# Patient Record
Sex: FEMALE | Race: ASIAN | URBAN - METROPOLITAN AREA
[De-identification: names, ages, dates, MRNs, and addresses within clinical notes are randomized per-mention and may not be internally consistent; named-entity substitution may affect disease eponyms.]

---

## 2022-02-17 ENCOUNTER — APPOINTMENT (RX ONLY)
Dept: URBAN - METROPOLITAN AREA CLINIC 45 | Facility: CLINIC | Age: 52
Setting detail: DERMATOLOGY
End: 2022-02-17

## 2022-02-17 DIAGNOSIS — L259 CONTACT DERMATITIS AND OTHER ECZEMA, UNSPECIFIED CAUSE: ICD-10-CM | Status: INADEQUATELY CONTROLLED

## 2022-02-17 PROBLEM — L23.9 ALLERGIC CONTACT DERMATITIS, UNSPECIFIED CAUSE: Status: ACTIVE | Noted: 2022-02-17

## 2022-02-17 PROCEDURE — ? ADDITIONAL NOTES

## 2022-02-17 PROCEDURE — ? TREATMENT REGIMEN

## 2022-02-17 PROCEDURE — ? MEDICATION COUNSELING

## 2022-02-17 PROCEDURE — ? PRESCRIPTION

## 2022-02-17 PROCEDURE — ? COUNSELING

## 2022-02-17 PROCEDURE — 99204 OFFICE O/P NEW MOD 45 MIN: CPT

## 2022-02-17 RX ORDER — TRIAMCINOLONE ACETONIDE 1 MG/G
CREAM TOPICAL BID
Qty: 454 | Refills: 0 | Status: ERX | COMMUNITY
Start: 2022-02-17

## 2022-02-17 RX ADMIN — TRIAMCINOLONE ACETONIDE: 1 CREAM TOPICAL at 00:00

## 2022-02-17 ASSESSMENT — LOCATION SIMPLE DESCRIPTION DERM
LOCATION SIMPLE: ABDOMEN
LOCATION SIMPLE: LEFT FOREARM
LOCATION SIMPLE: RIGHT FOREARM

## 2022-02-17 ASSESSMENT — LOCATION ZONE DERM
LOCATION ZONE: TRUNK
LOCATION ZONE: ARM

## 2022-02-17 ASSESSMENT — LOCATION DETAILED DESCRIPTION DERM
LOCATION DETAILED: LEFT RIB CAGE
LOCATION DETAILED: LEFT VENTRAL PROXIMAL FOREARM
LOCATION DETAILED: RIGHT VENTRAL PROXIMAL FOREARM

## 2022-02-17 NOTE — PROCEDURE: ADDITIONAL NOTES
Render Risk Assessment In Note?: no
Detail Level: Simple
Additional Notes: Recommended skin patch testing Monday Wednesday Friday

## 2022-02-17 NOTE — PROCEDURE: MEDICATION COUNSELING
Xelgioz Pregnancy And Lactation Text: This medication is Pregnancy Category D and is not considered safe during pregnancy.  The risk during breast feeding is also uncertain.

## 2022-02-17 NOTE — PROCEDURE: MIPS QUALITY
Quality 110: Preventive Care And Screening: Influenza Immunization: Influenza Immunization not Administered for Documented Reasons.
Detail Level: Detailed
Quality 431: Preventive Care And Screening: Unhealthy Alcohol Use - Screening: Patient identified as an unhealthy alcohol user when screened for unhealthy alcohol use using a systematic screening method and received brief counseling
Quality 130: Documentation Of Current Medications In The Medical Record: Current Medications Documented
Quality 226: Preventive Care And Screening: Tobacco Use: Screening And Cessation Intervention: Patient screened for tobacco use and is an ex/non-smoker

## 2022-03-14 ENCOUNTER — APPOINTMENT (RX ONLY)
Dept: URBAN - METROPOLITAN AREA CLINIC 45 | Facility: CLINIC | Age: 52
Setting detail: DERMATOLOGY
End: 2022-03-14

## 2022-03-14 DIAGNOSIS — L259 CONTACT DERMATITIS AND OTHER ECZEMA, UNSPECIFIED CAUSE: ICD-10-CM | Status: INADEQUATELY CONTROLLED

## 2022-03-14 PROBLEM — L23.9 ALLERGIC CONTACT DERMATITIS, UNSPECIFIED CAUSE: Status: ACTIVE | Noted: 2022-03-14

## 2022-03-14 PROCEDURE — ? FULL BODY SKIN EXAM - DECLINED

## 2022-03-14 PROCEDURE — ? COUNSELING

## 2022-03-14 PROCEDURE — 95044 PATCH/APPLICATION TESTS: CPT

## 2022-03-14 PROCEDURE — ? ADDITIONAL NOTES

## 2022-03-14 PROCEDURE — ? PATCH TESTING

## 2022-03-14 ASSESSMENT — LOCATION DETAILED DESCRIPTION DERM
LOCATION DETAILED: LEFT VENTRAL PROXIMAL FOREARM
LOCATION DETAILED: RIGHT VENTRAL PROXIMAL FOREARM

## 2022-03-14 ASSESSMENT — LOCATION ZONE DERM: LOCATION ZONE: ARM

## 2022-03-14 ASSESSMENT — LOCATION SIMPLE DESCRIPTION DERM
LOCATION SIMPLE: LEFT FOREARM
LOCATION SIMPLE: RIGHT FOREARM

## 2022-03-14 NOTE — PROCEDURE: PATCH TESTING
Detail Level: None
Number Of Patches (Maximum Allowable Per Dos By Cms Is 90): 47
Consent: Written consent obtained, risks reviewed including but not limited to rash, itching, allergic reaction, systemic rash, remote possiblity of anaphylaxis to allergen.
Post-Care Instructions: I reviewed with the patient in detail post-care instructions. Patient should not sweat, pick at, or get the patches wet for 48 hours.

## 2022-03-14 NOTE — PROCEDURE: ADDITIONAL NOTES
Render Risk Assessment In Note?: no
Detail Level: Simple
Additional Notes: PATCHES 38 & 47 N/A\\n\\n37: 2-hydroxyethyl-methacrylate\\n38: Amidoamine (stearamidopropyl dimethylamine)\\n39: ammonium persulphate\\n40: Cocamidopropylbetaime (CAPB)\\n41: dimethylaminopropylamine (DMAPA)\\n42: ethyl acrylate\\n43: formaldehyde \\n44: fragrance mix 2\\n45: propylene glycol\\n46: propolis\\n47: methylisothiazolinone
Additional Notes: Patient consent was obtained to proceed with the visit and recommended plan of care after discussion of all risks and benefits, including the risks of COVID-19 exposure.

## 2022-03-16 ENCOUNTER — APPOINTMENT (RX ONLY)
Dept: URBAN - METROPOLITAN AREA CLINIC 45 | Facility: CLINIC | Age: 52
Setting detail: DERMATOLOGY
End: 2022-03-16

## 2022-03-16 DIAGNOSIS — L259 CONTACT DERMATITIS AND OTHER ECZEMA, UNSPECIFIED CAUSE: ICD-10-CM

## 2022-03-16 PROBLEM — L23.9 ALLERGIC CONTACT DERMATITIS, UNSPECIFIED CAUSE: Status: ACTIVE | Noted: 2022-03-16

## 2022-03-16 PROCEDURE — 95044 PATCH/APPLICATION TESTS: CPT

## 2022-03-16 PROCEDURE — ? ACDS EXTENDED PATCH TEST READING

## 2022-03-16 PROCEDURE — ? COUNSELING

## 2022-03-16 PROCEDURE — ? ADDITIONAL NOTES

## 2022-03-16 PROCEDURE — ? PATCH TESTING

## 2022-03-16 ASSESSMENT — LOCATION ZONE DERM: LOCATION ZONE: ARM

## 2022-03-16 ASSESSMENT — LOCATION SIMPLE DESCRIPTION DERM
LOCATION SIMPLE: LEFT FOREARM
LOCATION SIMPLE: RIGHT FOREARM

## 2022-03-16 ASSESSMENT — LOCATION DETAILED DESCRIPTION DERM
LOCATION DETAILED: LEFT VENTRAL PROXIMAL FOREARM
LOCATION DETAILED: RIGHT VENTRAL PROXIMAL FOREARM

## 2022-03-16 NOTE — PROCEDURE: ADDITIONAL NOTES
Render Risk Assessment In Note?: no
Detail Level: Simple
Additional Notes: PATCHES 38 & 47 N/A\\n\\n37: 2-hydroxyethyl-methacrylate\\n38: Amidoamine (stearamidopropyl dimethylamine)\\n39: ammonium persulphate\\n40: Cocamidopropylbetaime (CAPB)\\n41: dimethylaminopropylamine (DMAPA)\\n42: ethyl acrylate\\n43: formaldehyde \\n44: fragrance mix 2\\n45: propylene glycol\\n46: propolis\\n47: methylisothiazolinone
Detail Level: Zone
Additional Notes: #1 - +1\\n#20 - +1\\n#25- maybe. Per Fely Hoffmann re check number 25 on Friday.

## 2022-03-16 NOTE — PROCEDURE: ACDS EXTENDED PATCH TEST READING
Allergen 24 Reaction: no reaction
Name Of Allergen 2: Wool Alcohols
Name Of Allergen 19: Methyldibromo Glutaronitrile
Name Of Allergen 30: Budesonide
Name Of Allergen 14: Epoxy Resin
Name Of Allergen 35: Disperse Blue 106
Detail Level: Zone
Name Of Allergen 4: Potassium Dichromate
Name Of Allergen 9: Negative control
Allergen 20 Reaction: 1+
Name Of Allergen 15: Carba Mix
Name Of Allergen 34: parthenolide
Number Of Patches Read: 47
Name Of Allergen 5: Mika Mix
Name Of Allergen 10: Balsam of [place]
Name Of Allergen 27: Tixocortol 21 pivalate
Name Of Allergen 22: Mercapto Mix
Name Of Allergen 17: CI + Me Isothiazolinone
Name Of Allergen 25: Diazolodinyl Urea
Name Of Allergen 32: Mercaptobenzothiazole
Name Of Allergen 7: Colophony
Name Of Allergen 12: Cobalt Dichloride
Name Of Allergen 18: Quaternium -15
Name Of Allergen 24: Thiuram Mix
Name Of Allergen 31: Hydrocortisone 17Butyrate
Name Of Allergen 13: Sheri Butylphenol Formaldehyde
Name Of Allergen 36: 2 Bromo 2 Nitropropane 1- 3 diol
Name Of Allergen 3: Neomycin Sulfate
Name Of Allergen 8: Paraben Mix
Name Of Allergen 20: P-phenylene Diamine
Name Of Allergen 29: Imidazolidinyl urea
What Reading Time Point?: 48 hour
Name Of Allergen 21: Formaldehyde
Name Of Allergen 28: Gold Sodium ThioSulfate
Show Negative Results In The Note?: Yes
Name Of Allergen 16: Black Rubber Mix
Name Of Allergen 26: Quinoline Mix
Name Of Allergen 33: Bacitracin
Show Allergen Counseling In The Note?: No
Name Of Allergen 6: Fragrance Mix
Name Of Allergen 11: Ethylenediamine Dihydrochloride
Allergen 25 Reaction: irritant reaction
Name Of Allergen 1: Nickel Sulfate
Name Of Allergen 23: Thimerosal

## 2022-03-18 ENCOUNTER — APPOINTMENT (RX ONLY)
Dept: URBAN - METROPOLITAN AREA CLINIC 45 | Facility: CLINIC | Age: 52
Setting detail: DERMATOLOGY
End: 2022-03-18

## 2022-03-18 DIAGNOSIS — L259 CONTACT DERMATITIS AND OTHER ECZEMA, UNSPECIFIED CAUSE: ICD-10-CM

## 2022-03-18 PROBLEM — L23.9 ALLERGIC CONTACT DERMATITIS, UNSPECIFIED CAUSE: Status: ACTIVE | Noted: 2022-03-18

## 2022-03-18 PROCEDURE — 99213 OFFICE O/P EST LOW 20 MIN: CPT

## 2022-03-18 PROCEDURE — ? ACDS EXTENDED PATCH TEST READING

## 2022-03-18 PROCEDURE — ? COUNSELING

## 2022-03-18 PROCEDURE — ? ADDITIONAL NOTES

## 2022-03-18 ASSESSMENT — LOCATION SIMPLE DESCRIPTION DERM
LOCATION SIMPLE: RIGHT FOREARM
LOCATION SIMPLE: LEFT FOREARM

## 2022-03-18 ASSESSMENT — LOCATION DETAILED DESCRIPTION DERM
LOCATION DETAILED: LEFT VENTRAL PROXIMAL FOREARM
LOCATION DETAILED: RIGHT VENTRAL PROXIMAL FOREARM

## 2022-03-18 ASSESSMENT — LOCATION ZONE DERM: LOCATION ZONE: ARM

## 2022-03-18 NOTE — PROCEDURE: ADDITIONAL NOTES
Render Risk Assessment In Note?: no
Detail Level: Simple
Additional Notes: PATCHES 38 & 47 N/A\\n\\n37: 2-hydroxyethyl-methacrylate\\n38: Amidoamine (stearamidopropyl dimethylamine)\\n39: ammonium persulphate\\n40: Cocamidopropylbetaime (CAPB)\\n41: dimethylaminopropylamine (DMAPA)\\n42: ethyl acrylate\\n43: formaldehyde \\n44: fragrance mix 2\\n45: propylene glycol\\n46: propolis\\n47: methylisothiazolinone
Detail Level: Zone
Additional Notes: #1 - +3\\n#20 - +3\\n#43 - +3\\n#45 - +1

## 2022-06-01 ENCOUNTER — APPOINTMENT (RX ONLY)
Dept: URBAN - METROPOLITAN AREA CLINIC 44 | Facility: CLINIC | Age: 52
Setting detail: DERMATOLOGY
End: 2022-06-01

## 2022-06-01 ENCOUNTER — RX ONLY (OUTPATIENT)
Age: 52
Setting detail: RX ONLY
End: 2022-06-01

## 2022-06-01 DIAGNOSIS — L259 CONTACT DERMATITIS AND OTHER ECZEMA, UNSPECIFIED CAUSE: ICD-10-CM | Status: INADEQUATELY CONTROLLED

## 2022-06-01 PROBLEM — L23.9 ALLERGIC CONTACT DERMATITIS, UNSPECIFIED CAUSE: Status: ACTIVE | Noted: 2022-06-01

## 2022-06-01 PROCEDURE — 99213 OFFICE O/P EST LOW 20 MIN: CPT

## 2022-06-01 PROCEDURE — ? ADDITIONAL NOTES

## 2022-06-01 PROCEDURE — ? COUNSELING

## 2022-06-01 PROCEDURE — ? PRESCRIPTION MEDICATION MANAGEMENT

## 2022-06-01 RX ORDER — TRIAMCINOLONE ACETONIDE 1 MG/G
CREAM TOPICAL BID
Qty: 454 | Refills: 1 | Status: CANCELLED

## 2022-06-01 ASSESSMENT — LOCATION SIMPLE DESCRIPTION DERM
LOCATION SIMPLE: RIGHT FOREARM
LOCATION SIMPLE: LEFT FOREARM

## 2022-06-01 ASSESSMENT — LOCATION DETAILED DESCRIPTION DERM
LOCATION DETAILED: RIGHT VENTRAL PROXIMAL FOREARM
LOCATION DETAILED: LEFT VENTRAL PROXIMAL FOREARM

## 2022-06-01 ASSESSMENT — LOCATION ZONE DERM: LOCATION ZONE: ARM

## 2022-06-01 NOTE — PROCEDURE: PRESCRIPTION MEDICATION MANAGEMENT
Render In Strict Bullet Format?: No
Detail Level: Zone
Continue Regimen: triamcinolone acetonide 0.1 % topical ointment Bid\\nQuantity: 80.0 g  Days Supply: 30\\nSig: Apply to aa of the body  twice a day x  2 weeks then prn

## 2022-06-01 NOTE — PROCEDURE: ADDITIONAL NOTES
Render Risk Assessment In Note?: no
Detail Level: Simple
Additional Notes: - Pt was allergic to A couple of numbers from the patches that were placed on her back.\\n- Pt was given her paperwork that explains what she’s exactly allergic to so she can have a reference to look at later on. \\n- pt is allergic to very common things that might trigger her to feel itchy, was advised to look at her product ingredients to determine if it’s ok to use. \\GildardoSO was Advised to follow up with an ALLERGIST if itching continues.
Additional Notes: Patient consent was obtained to proceed with the visit and recommended plan of care after discussion of all risks and benefits, including the risks of COVID-19 exposure.

## 2022-07-13 ENCOUNTER — RX ONLY (OUTPATIENT)
Age: 52
Setting detail: RX ONLY
End: 2022-07-13

## 2022-07-13 RX ORDER — TRIAMCINOLONE ACETONIDE 1 MG/G
CREAM TOPICAL BID
Qty: 454 | Refills: 0 | Status: CANCELLED